# Patient Record
Sex: MALE | Race: WHITE | URBAN - METROPOLITAN AREA
[De-identification: names, ages, dates, MRNs, and addresses within clinical notes are randomized per-mention and may not be internally consistent; named-entity substitution may affect disease eponyms.]

---
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
## 2022-04-25 ENCOUNTER — APPOINTMENT (OUTPATIENT)
Dept: ORTHOPEDIC SURGERY | Facility: CLINIC | Age: 22
End: 2022-04-25
Payer: COMMERCIAL

## 2022-04-25 VITALS — BODY MASS INDEX: 23.7 KG/M2 | WEIGHT: 151 LBS | HEIGHT: 66.93 IN

## 2022-04-25 DIAGNOSIS — S63.8X2D SPRAIN OF OTHER PART OF LEFT WRIST AND HAND, SUBSEQUENT ENCOUNTER: ICD-10-CM

## 2022-04-25 DIAGNOSIS — S63.592D OTHER SPECIFIED SPRAIN OF LEFT WRIST, SUBSEQUENT ENCOUNTER: ICD-10-CM

## 2022-04-25 PROCEDURE — 99214 OFFICE O/P EST MOD 30 MIN: CPT

## 2022-04-26 PROBLEM — S63.8X2D TEAR OF LEFT SCAPHOLUNATE LIGAMENT, SUBSEQUENT ENCOUNTER: Status: ACTIVE | Noted: 2022-04-26

## 2022-04-26 PROBLEM — S63.592D COMPLEX TEAR OF TRIANGULAR FIBROCARTILAGE OF LEFT WRIST, SUBSEQUENT ENCOUNTER: Status: ACTIVE | Noted: 2022-04-26

## 2022-04-26 NOTE — HISTORY OF PRESENT ILLNESS
[7] : 7 [2] : 2 [Sharp] : sharp [de-identified] : L wrist MRI arthrogram was not successful\par \par MRI shows intact dorsal/volar SL and partial TFCC  [FreeTextEntry1] : left Wrist  [FreeTextEntry5] : patient states his left wrist is worse after the arthrogram  [de-identified] : none

## 2022-04-26 NOTE — ASSESSMENT
[FreeTextEntry1] : I idsucssed diagnostic arthroscopy with him today\par Will hold off as pain is better and he is starting nursing school next month\par Return prn

## 2025-06-30 RX ORDER — ESCITALOPRAM OXALATE 10 MG/1
10 TABLET ORAL
Qty: 90 | Refills: 0 | Status: ACTIVE | COMMUNITY
Start: 2025-06-30 | End: 1900-01-01